# Patient Record
Sex: FEMALE | Race: BLACK OR AFRICAN AMERICAN | NOT HISPANIC OR LATINO | URBAN - METROPOLITAN AREA
[De-identification: names, ages, dates, MRNs, and addresses within clinical notes are randomized per-mention and may not be internally consistent; named-entity substitution may affect disease eponyms.]

---

## 2019-03-03 ENCOUNTER — EMERGENCY (EMERGENCY)
Facility: HOSPITAL | Age: 24
LOS: 1 days | Discharge: HOME | End: 2019-03-03
Attending: EMERGENCY MEDICINE
Payer: COMMERCIAL

## 2019-03-03 VITALS
SYSTOLIC BLOOD PRESSURE: 125 MMHG | OXYGEN SATURATION: 100 % | DIASTOLIC BLOOD PRESSURE: 82 MMHG | HEART RATE: 85 BPM | RESPIRATION RATE: 16 BRPM

## 2019-03-03 VITALS
DIASTOLIC BLOOD PRESSURE: 91 MMHG | OXYGEN SATURATION: 100 % | WEIGHT: 175.05 LBS | RESPIRATION RATE: 16 BRPM | TEMPERATURE: 98 F | HEIGHT: 65 IN | HEART RATE: 94 BPM | SYSTOLIC BLOOD PRESSURE: 138 MMHG

## 2019-03-03 PROCEDURE — 99284 EMERGENCY DEPT VISIT MOD MDM: CPT

## 2019-03-03 PROCEDURE — 99283 EMERGENCY DEPT VISIT LOW MDM: CPT

## 2019-03-03 RX ORDER — ACETAMINOPHEN 500 MG
650 TABLET ORAL ONCE
Qty: 0 | Refills: 0 | Status: COMPLETED | OUTPATIENT
Start: 2019-03-03 | End: 2019-03-03

## 2019-03-03 RX ADMIN — Medication 650 MILLIGRAM(S): at 20:06

## 2019-03-03 RX ADMIN — Medication 650 MILLIGRAM(S): at 19:36

## 2019-03-03 NOTE — ED PROVIDER NOTE - OBJECTIVE STATEMENT
24 y/o F patient w/ no significant PMHx and no significant PSHx presents to the ED with a headache. Patient works as a  for [employer] and was working a flight front [place], [place] to [facility] in [place]. Patient reports she was notified x45 minutes later that an airplane decompression took place in mid-air. Patient states the oxygen masks did not come down into the cabin. Patient says when she got off the plane she began experiencing dizziness, headache, and nausea. Patient says he Sx have improved. Patient denies any other complaints. NKDA

## 2019-03-03 NOTE — ED ADULT NURSE NOTE - OBJECTIVE STATEMENT
pt came in w c/o headache & dizziness s/p decompression on flight. Denies cp, sob, nausea, vomiting. Breathing unlabored.

## 2019-03-03 NOTE — ED PROVIDER NOTE - PROGRESS NOTE DETAILS
Expressing relief with Tylenol and O2, will be grounded for 24 hrs. Pt is well appearing walking with steady gait, stable for discharge and follow up without fail with medical doctor. I had a detailed discussion with the patient and/or guardian regarding the historical points, exam findings, and any diagnostic results supporting the discharge diagnosis. Pt educated on care and need for follow up. Strict return instructions and red flag signs and symptoms discussed with patient. Questions answered. Pt shows understanding of discharge information and agrees to follow.

## 2019-03-03 NOTE — ED ADULT NURSE NOTE - CHPI ED NUR SYMPTOMS NEG
no fever/no nausea/no vomiting/no decreased eating/drinking/no chills/no tingling/no weakness no blurred vision/no nausea/no numbness/no vomiting/no loss of consciousness/no weakness/no fever/no change in level of consciousness/no confusion
